# Patient Record
Sex: FEMALE | Race: BLACK OR AFRICAN AMERICAN | NOT HISPANIC OR LATINO | Employment: FULL TIME | URBAN - METROPOLITAN AREA
[De-identification: names, ages, dates, MRNs, and addresses within clinical notes are randomized per-mention and may not be internally consistent; named-entity substitution may affect disease eponyms.]

---

## 2021-06-19 VITALS
HEART RATE: 102 BPM | BODY MASS INDEX: 21.16 KG/M2 | RESPIRATION RATE: 14 BRPM | HEIGHT: 62 IN | OXYGEN SATURATION: 98 % | TEMPERATURE: 98.3 F | WEIGHT: 115 LBS

## 2021-06-19 PROCEDURE — 96372 THER/PROPH/DIAG INJ SC/IM: CPT

## 2021-06-19 PROCEDURE — 99283 EMERGENCY DEPT VISIT LOW MDM: CPT

## 2021-06-20 ENCOUNTER — HOSPITAL ENCOUNTER (EMERGENCY)
Facility: HOSPITAL | Age: 21
Discharge: HOME | End: 2021-06-20
Attending: EMERGENCY MEDICINE | Admitting: EMERGENCY MEDICINE

## 2021-06-20 DIAGNOSIS — Z20.2 STD EXPOSURE: Primary | ICD-10-CM

## 2021-06-20 DIAGNOSIS — N76.0 BV (BACTERIAL VAGINOSIS): ICD-10-CM

## 2021-06-20 DIAGNOSIS — B96.89 BV (BACTERIAL VAGINOSIS): ICD-10-CM

## 2021-06-20 LAB
B-HCG UR QL: NEGATIVE
BACTERIA UR QL AUTO: ABNORMAL /HPF
BILIRUB UR QL STRIP: NEGATIVE
CLARITY UR: CLEAR
CLUE CELLS SPEC QL WET PREP: ABNORMAL
COLOR UR: YELLOW
GLUCOSE UR STRIP-MCNC: NEGATIVE MG/DL
HGB UR QL STRIP.AUTO: NEGATIVE
HYALINE CASTS UR QL AUTO: ABNORMAL /LPF
HYDATID CYST SPEC WET PREP: ABNORMAL
KETONES UR QL STRIP: ABNORMAL
KOH PREP NAIL: NORMAL
LEUKOCYTE ESTERASE UR QL STRIP.AUTO: NEGATIVE
NITRITE UR QL STRIP: NEGATIVE
PH UR STRIP.AUTO: 6.5 [PH] (ref 5–8)
PROT UR QL STRIP: ABNORMAL
RBC # UR: ABNORMAL /HPF
REF LAB TEST METHOD: ABNORMAL
SP GR UR STRIP: >=1.03 (ref 1–1.03)
SQUAMOUS #/AREA URNS HPF: ABNORMAL /HPF
T VAGINALIS SPEC QL WET PREP: ABNORMAL
UROBILINOGEN UR QL STRIP: ABNORMAL
WBC SPEC QL WET PREP: ABNORMAL
WBC UR QL AUTO: ABNORMAL /HPF
YEAST GENITAL QL WET PREP: ABNORMAL

## 2021-06-20 PROCEDURE — 96372 THER/PROPH/DIAG INJ SC/IM: CPT

## 2021-06-20 PROCEDURE — 87210 SMEAR WET MOUNT SALINE/INK: CPT | Performed by: PHYSICIAN ASSISTANT

## 2021-06-20 PROCEDURE — 81001 URINALYSIS AUTO W/SCOPE: CPT | Performed by: PHYSICIAN ASSISTANT

## 2021-06-20 PROCEDURE — 87220 TISSUE EXAM FOR FUNGI: CPT | Performed by: PHYSICIAN ASSISTANT

## 2021-06-20 PROCEDURE — 87491 CHLMYD TRACH DNA AMP PROBE: CPT | Performed by: PHYSICIAN ASSISTANT

## 2021-06-20 PROCEDURE — 87591 N.GONORRHOEAE DNA AMP PROB: CPT | Performed by: PHYSICIAN ASSISTANT

## 2021-06-20 PROCEDURE — 81025 URINE PREGNANCY TEST: CPT | Performed by: PHYSICIAN ASSISTANT

## 2021-06-20 PROCEDURE — 25010000002 CEFTRIAXONE PER 250 MG: Performed by: PHYSICIAN ASSISTANT

## 2021-06-20 RX ORDER — AZITHROMYCIN 250 MG/1
1000 TABLET, FILM COATED ORAL ONCE
Status: COMPLETED | OUTPATIENT
Start: 2021-06-20 | End: 2021-06-20

## 2021-06-20 RX ORDER — METRONIDAZOLE 500 MG/1
500 TABLET ORAL 2 TIMES DAILY
Qty: 14 TABLET | Refills: 0 | Status: SHIPPED | OUTPATIENT
Start: 2021-06-20

## 2021-06-20 RX ADMIN — AZITHROMYCIN DIHYDRATE 1000 MG: 250 TABLET, FILM COATED ORAL at 02:14

## 2021-06-20 RX ADMIN — LIDOCAINE HYDROCHLORIDE 490 MG: 10 INJECTION, SOLUTION EPIDURAL; INFILTRATION; INTRACAUDAL; PERINEURAL at 02:14

## 2021-06-20 NOTE — ED PROVIDER NOTES
MD ATTESTATION NOTE    The ION and I have discussed this patient's history, physical exam, and treatment plan.  I have reviewed the documentation and personally had a face to face interaction with the patient. I affirm the documentation and agree with the treatment and plan.  The attached note describes my personal findings.      Sparkle Marcial is a 20 y.o. female who presents to the ED c/o STD exposure several days ago.  States that her significant other was recently diagnosed with gonorrhea.  She denies any fevers chills no discharge or discomfort.      On exam:  No acute distress, normocephalic atraumatic, supple nontender, regular rate and rhythm, clear to auscultation bilaterally, soft nontender nondistended, moving all extremities well    Pelvic per PA    Labs  Recent Results (from the past 24 hour(s))   Wet Prep, Genital - Swab, Vagina    Collection Time: 06/20/21  1:35 AM    Specimen: Vagina; Swab   Result Value Ref Range    YEAST No yeast seen No yeast seen    HYPHAL ELEMENTS No Hyphal elements seen No Hyphal elements seen    WBC'S 3+ WBC's seen (A) No WBC's seen    Clue Cells, Wet Prep 2+ Clue cells seen (A) No Clue cells seen    Trichomonas, Wet Prep No Trichomonas seen No Trichomonas seen   KOH Prep - Swab, Cervix    Collection Time: 06/20/21  1:35 AM    Specimen: Cervix; Swab   Result Value Ref Range    KOH Prep No yeast or hyphal elements seen No yeast or hyphal elements seen   Urinalysis With Microscopic If Indicated (No Culture) - Urine, Clean Catch    Collection Time: 06/20/21  2:15 AM    Specimen: Urine, Clean Catch   Result Value Ref Range    Color, UA Yellow Yellow, Straw    Appearance, UA Clear Clear    pH, UA 6.5 5.0 - 8.0    Specific Gravity, UA >=1.030 1.005 - 1.030    Glucose, UA Negative Negative    Ketones, UA 15 mg/dL (1+) (A) Negative    Bilirubin, UA Negative Negative    Blood, UA Negative Negative    Protein, UA 30 mg/dL (1+) (A) Negative    Leuk Esterase, UA Negative Negative    Nitrite,  UA Negative Negative    Urobilinogen, UA 1.0 E.U./dL 0.2 - 1.0 E.U./dL   Pregnancy, Urine - Urine, Clean Catch    Collection Time: 06/20/21  2:15 AM    Specimen: Urine, Clean Catch   Result Value Ref Range    HCG, Urine QL Negative Negative   Urinalysis, Microscopic Only - Urine, Clean Catch    Collection Time: 06/20/21  2:15 AM    Specimen: Urine, Clean Catch   Result Value Ref Range    RBC, UA None Seen None Seen, 0-2 /HPF    WBC, UA None Seen None Seen, 0-2 /HPF    Bacteria, UA None Seen None Seen /HPF    Squamous Epithelial Cells, UA 7-12 (A) None Seen, 0-2 /HPF    Hyaline Casts, UA None Seen None Seen /LPF    Methodology Automated Microscopy        Radiology  No Radiology Exams Resulted Within Past 24 Hours    Medical Decision Making:  ED Course as of Jun 20 0543   Sun Jun 20, 2021   0232 She is also noted to have clue cells and and whites on wet prep.  We will treat for BV in addition to STD.  Patient is ready for discharge at this time    [RC]      ED Course User Index  [RC] Isak Brenner III, PA           PPE: Both the patient and I wore a surgical mask throughout the entire patient encounter. I wore protective goggles.     Diagnosis  Final diagnoses:   STD exposure   BV (bacterial vaginosis)        Greg Hong MD  06/20/21 6154

## 2021-06-20 NOTE — ED PROVIDER NOTES
EMERGENCY DEPARTMENT ENCOUNTER    Room Number:  06/06  Date of encounter:  6/20/2021  PCP: Provider, No Known  Historian: Patient      HPI:  Chief Complaint: STD exposure  A complete HPI/ROS/PMH/PSH/SH/FH are unobtainable due to: Nothing    Context: Sparkle Marcial is a 20 y.o. female who presents to the ED c/o STD exposure that occurred approximately 3 to 4 days ago.  Patient states her boyfriend was recently diagnosed with gonorrhea.  He reports scant discharge as of yesterday.  She denies fever, chills, pelvic pain associated with the discharge.  She denies pregnancy.  She is here for further evaluation.      PAST MEDICAL HISTORY  Active Ambulatory Problems     Diagnosis Date Noted   • No Active Ambulatory Problems     Resolved Ambulatory Problems     Diagnosis Date Noted   • No Resolved Ambulatory Problems     No Additional Past Medical History         PAST SURGICAL HISTORY  No past surgical history on file.      FAMILY HISTORY  No family history on file.      SOCIAL HISTORY  Social History     Socioeconomic History   • Marital status: Single     Spouse name: Not on file   • Number of children: Not on file   • Years of education: Not on file   • Highest education level: Not on file   Tobacco Use   • Smoking status: Never Smoker   • Smokeless tobacco: Never Used   Substance and Sexual Activity   • Alcohol use: Defer   • Drug use: Defer   • Sexual activity: Yes     Partners: Male     Birth control/protection: None     Comment: exposure to STD         ALLERGIES  Amoxicillin        REVIEW OF SYSTEMS  Review of Systems   Constitutional: Negative for chills and fever.   Genitourinary: Positive for vaginal discharge. Negative for flank pain and pelvic pain.   Skin: Negative for rash.        All systems reviewed and negative except for those discussed in HPI.       PHYSICAL EXAM    I have reviewed the triage vital signs and nursing notes.    ED Triage Vitals [06/19/21 2300]   Temp Heart Rate Resp BP SpO2   98.3 °F (36.8  °C) 102 14 -- 98 %      Temp src Heart Rate Source Patient Position BP Location FiO2 (%)   Tympanic -- -- -- --       Physical Exam  GENERAL: Well-nourished, nontoxic, no acute distress  HENT: nares patent  EYES: no scleral icterus  CV: regular rhythm, regular rate  RESPIRATORY: normal effort  ABDOMEN: soft, nontender  : No cervical motion tenderness.  There is a somewhat red appearing cervix with questionable discharge.  No adnexal tenderness no CVA tenderness.  MUSCULOSKELETAL: no deformity  NEURO: alert, moves all extremities, follows commands  SKIN: warm, dry        LAB RESULTS  Recent Results (from the past 24 hour(s))   Wet Prep, Genital - Swab, Vagina    Collection Time: 06/20/21  1:35 AM    Specimen: Vagina; Swab   Result Value Ref Range    YEAST No yeast seen No yeast seen    HYPHAL ELEMENTS No Hyphal elements seen No Hyphal elements seen    WBC'S 3+ WBC's seen (A) No WBC's seen    Clue Cells, Wet Prep 2+ Clue cells seen (A) No Clue cells seen    Trichomonas, Wet Prep No Trichomonas seen No Trichomonas seen   KOH Prep - Swab, Cervix    Collection Time: 06/20/21  1:35 AM    Specimen: Cervix; Swab   Result Value Ref Range    KOH Prep No yeast or hyphal elements seen No yeast or hyphal elements seen       Ordered the above labs and independently reviewed the results.        RADIOLOGY  No Radiology Exams Resulted Within Past 24 Hours    I ordered the above noted radiological studies. Reviewed by me and discussed with radiologist.  See dictation for official radiology interpretation.      PROCEDURES    Procedures      MEDICATIONS GIVEN IN ER    Medications   cefTRIAXone (ROCEPHIN) 350 mg/ml in lidocaine 1% IM syringe (500 mg vial) (490 mg Intramuscular Given 6/20/21 0214)   azithromycin (ZITHROMAX) tablet 1,000 mg (1,000 mg Oral Given 6/20/21 0214)         PROGRESS, DATA ANALYSIS, CONSULTS, AND MEDICAL DECISION MAKING    All labs have been independently reviewed by me.  All radiology studies have been reviewed  by me and discussed with radiologist dictating the report.   EKG's independently viewed and interpreted by me.  Discussion below represents my analysis of pertinent findings related to patient's condition, differential diagnosis, treatment plan and final disposition.    DDx includes but is not limited to: Gonorrhea, chlamydia, trichomoniasis, BV, PID.  Exam points away from PID.  Given the known exposure to gonorrhea we will go ahead and obtain GNC, wet prep, KOH, UA, hCG.  We will treat the patient with 500 mg Rocephin IM and a gram of Zithromax p.o.    ED Course as of Jun 20 0237   Sun Jun 20, 2021   0232 She is also noted to have clue cells and and whites on wet prep.  We will treat for BV in addition to STD.  Patient is ready for discharge at this time    [RC]      ED Course User Index  [RC] Isak Brenner III, PA       Patient was placed in face mask in first look. Patient was wearing facemask when I entered the room and throughout our encounter. I wore full protective equipment throughout this patient encounter including a face mask, and gloves. Hand hygiene was performed before donning protective equipment and after removal when leaving the room.    AS OF 02:37 EDT VITALS:    BP -    HR - 102  TEMP - 98.3 °F (36.8 °C) (Tympanic)  O2 SATS - 98%        DIAGNOSIS  Final diagnoses:   STD exposure   BV (bacterial vaginosis)         DISPOSITION  DISCHARGE    Patient discharged in stable condition.    Reviewed implications of results, diagnosis, meds, responsibility to follow up, warning signs and symptoms of possible worsening, potential complications and reasons to return to ER.    Patient/Family voiced understanding of above instructions.    Discussed plan for discharge, as there is no emergent indication for admission. Patient referred to primary care provider for BP management due to today's BP. Pt/family is agreeable and understands need for follow up and repeat testing.  Pt is aware that discharge  does not mean that nothing is wrong but it indicates no emergency is present that requires admission and they must continue care with follow-up as given below or physician of their choice.     FOLLOW-UP  PATIENT CONNECTION - [facility] [address]  [phone]  Schedule an appointment as soon as possible for a visit   For further evaluation and treatment    [facility]  [address] [address]  [phone]  Schedule an appointment as soon as possible for a visit   For further evaluation and treatment should you wish for further STD testing         Medication List      New Prescriptions    metroNIDAZOLE 500 MG tablet  Commonly known as: FLAGYL  Take 1 tablet by mouth 2 (Two) Times a Day.           Where to Get Your Medications      You can get these medications from any pharmacy    Bring a paper prescription for each of these medications  · metroNIDAZOLE 500 MG tablet                Isak Brenner III, PA  06/20/21 0237

## 2021-06-20 NOTE — ED NOTES
Provided pt with water to try to get her to urinated per TAMMIE Brenner request.     Denise Wiley, CARLOS  06/20/21 0203

## 2021-06-20 NOTE — ED NOTES
Here for a std check due to sexual partner having pain w urination and dx w gonorrhea recently. She does not have any symptoms.     +mask +triage wearing appropriate PPE.     Dayana Babb, RN  06/19/21 4245

## 2021-06-20 NOTE — ED NOTES
Pt ambulatory c steady gait, AAOx4, ABC's intact, NAD noted at this time. Pt discharged c belongings and educational packet. PPE worn by this RN c pt wearing mask during encounters.     Pt playing in bed with sexual partner upon this RN arrival to ED room for d/c. Pt unwilling to sit up for d/c vitals.      Austin Navarro, RN  06/20/21 6366

## 2021-06-22 LAB
C TRACH RRNA SPEC QL NAA+PROBE: NEGATIVE
N GONORRHOEA RRNA SPEC QL NAA+PROBE: NEGATIVE

## 2024-07-20 ENCOUNTER — HOSPITAL ENCOUNTER (EMERGENCY)
Facility: HOSPITAL | Age: 24
Discharge: HOME | End: 2024-07-21
Attending: EMERGENCY MEDICINE

## 2024-07-20 DIAGNOSIS — L02.215 PERINEAL ABSCESS: Primary | ICD-10-CM

## 2024-07-20 PROCEDURE — 99283 EMERGENCY DEPT VISIT LOW MDM: CPT

## 2024-07-20 RX ORDER — HYDROCODONE BITARTRATE AND ACETAMINOPHEN 5; 325 MG/1; MG/1
1 TABLET ORAL EVERY 6 HOURS PRN
Status: DISCONTINUED | OUTPATIENT
Start: 2024-07-20 | End: 2024-07-21 | Stop reason: HOSPADM

## 2024-07-20 RX ORDER — LIDOCAINE HYDROCHLORIDE AND EPINEPHRINE 10; 10 MG/ML; UG/ML
10 INJECTION, SOLUTION INFILTRATION; PERINEURAL ONCE
Status: COMPLETED | OUTPATIENT
Start: 2024-07-20 | End: 2024-07-21

## 2024-07-21 VITALS
SYSTOLIC BLOOD PRESSURE: 124 MMHG | BODY MASS INDEX: 19.49 KG/M2 | HEART RATE: 103 BPM | DIASTOLIC BLOOD PRESSURE: 81 MMHG | OXYGEN SATURATION: 98 % | HEIGHT: 63 IN | WEIGHT: 110 LBS | TEMPERATURE: 97.5 F | RESPIRATION RATE: 16 BRPM

## 2024-07-21 RX ORDER — DOXYCYCLINE 100 MG/1
100 CAPSULE ORAL ONCE
Status: COMPLETED | OUTPATIENT
Start: 2024-07-21 | End: 2024-07-21

## 2024-07-21 RX ORDER — DOXYCYCLINE 100 MG/1
100 CAPSULE ORAL 2 TIMES DAILY
Qty: 14 CAPSULE | Refills: 0 | Status: SHIPPED | OUTPATIENT
Start: 2024-07-21

## 2024-07-21 RX ADMIN — DOXYCYCLINE 100 MG: 100 CAPSULE ORAL at 02:12

## 2024-07-21 RX ADMIN — LIDOCAINE HYDROCHLORIDE AND EPINEPHRINE 10 ML: 10; 10 INJECTION, SOLUTION INFILTRATION; PERINEURAL at 01:34

## 2024-07-21 RX ADMIN — HYDROCODONE BITARTRATE AND ACETAMINOPHEN 1 TABLET: 5; 325 TABLET ORAL at 00:08

## 2024-07-21 NOTE — ED NOTES
"Pt to ER from home c/o abscess \"between my legs\" x 2 days. Pt states \"it's too painful for me to touch it so I came here.\"   "

## 2024-07-21 NOTE — DISCHARGE INSTRUCTIONS
Change the bandage 2-3 times daily.  After 2 days, remove the packing. Wash hands, remove gently, and then wash hands again. Anything that drains from this area is considered infections/contagious.  Take all of the medication as prescribed.  Recheck with your PCP in 2 days.  Return to the ER for fever >100.4, vomiting, spreading redness, increasing pain, any concerns.

## 2024-07-21 NOTE — ED PROVIDER NOTES
EMERGENCY DEPARTMENT MD ATTESTATION NOTE    Room Number:  06/06  PCP: Provider, No Known  Independent Historians: Patient    HPI:  A complete HPI/ROS/PMH/PSH/SH/FH are unobtainable due to: None    Chronic or social conditions impacting patient care (Social Determinants of Health): None      Context: Sparkle Marcial is a 23 y.o. female who presents to the ED c/o acute abscess in the genital area which is worsened for the past couple of days.  She denies any fevers.  She says she has had similar abscess formations in this area in the past which were able to spontaneously drain in time.  She has never required any incision and drainage procedures by medical providers before.  She denies any nausea or vomiting or diarrhea.  Denies any dysuria symptoms.      Review of prior external notes (non-ED) -and- Review of prior external test results outside of this encounter: I independently reviewed the family medicine progress note from May 27, 2023 when patient was evaluated for vaginal discharge    Prescription drug monitoring program review: DORIE reviewed by Aram Galvez MD         PHYSICAL EXAM    I have reviewed the triage vital signs and nursing notes.    ED Triage Vitals   Temp Heart Rate Resp BP SpO2   07/20/24 2316 07/20/24 2316 07/20/24 2316 07/21/24 0009 07/20/24 2316   97.5 °F (36.4 °C) 103 16 124/81 98 %      Temp src Heart Rate Source Patient Position BP Location FiO2 (%)   07/20/24 2316 -- -- -- --   Tympanic           Physical Exam  GENERAL: alert, no acute distress but appears uncomfortable  SKIN: Warm, dry  HENT: Normocephalic, atraumatic  EYES: no scleral icterus  CV: regular rhythm, regular rate  RESPIRATORY: normal effort, lungs clear  ABDOMEN: soft, nontender, nondistended  MUSCULOSKELETAL: no deformity, no asymmetry to the extremity  NEURO: alert, moves all extremities, follows commands        MEDICATIONS GIVEN IN ER  Medications   HYDROcodone-acetaminophen (NORCO) 5-325 MG per tablet 1 tablet (1  tablet Oral Given 7/21/24 0008)   lidocaine 1% - EPINEPHrine 1:115539 (XYLOCAINE W/EPI) 1 %-1:935722 injection 10 mL (10 mL Injection Given 7/21/24 0134)   doxycycline (MONODOX) capsule 100 mg (100 mg Oral Given 7/21/24 0212)         ORDERS PLACED DURING THIS VISIT:  Orders Placed This Encounter   Procedures    Incision & Drainage         PROCEDURES  Procedures            PROGRESS, DATA ANALYSIS, CONSULTS, AND MEDICAL DECISION MAKING  All labs have been independently interpreted by me.  All radiology studies have been reviewed by me. All EKG's have been independently viewed and interpreted by me.  Discussion below represents my analysis of pertinent findings related to patient's condition, differential diagnosis, treatment plan and final disposition.    Differential diagnosis includes but is not limited to Bartholin's gland abscess, perirectal abscess, cellulitis, vaginitis, hemorrhoid.    Clinical Scores:                   ED Course as of 07/21/24 0239   Sat Jul 20, 2024   2320 I discussed the case with Dr. Galvez and he agrees to evaluate the patient at the bedside.    [CC]   Sun Jul 21, 2024   0152 I&D was performed at the bedside.  Patient tolerated procedure well.      She is a generally healthy 23-year-old female who presents today with a simple abscess in the perineum.  On arrival here in the emergency department vitals are reassuring, she is afebrile.  On my exam the patient has a small 1 cm abscess in the perineum.  There is no surrounding induration, cellulitis, or crepitus.  She is not a diabetic and is not anticoagulated.  I&D was performed and patient tolerated procedure well.  Will start patient on doxycycline with first dose given here in the ED and prescription sent to the pharmacy.  Educated her on wound care instructions.  Return precautions were given.  She is appropriate for discharge with outpatient follow-up. [CC]      ED Course User Index  [CC] Allison Mckeon PA-C       MDM:   Patient  "presents with an abscess formation in the perineum which shows clinical features amenable to incision and drainage.  Will proceed with this procedure in the ED.  Plan to discharge home on oral antibiotic regimen and have her follow-up with PCP.  Will discuss with patient the usual \"return to ER\" instructions prior to discharge.    COMPLEXITY OF CARE  Admission was considered but after careful review of the patient's presentation, physical examination, diagnostic results, and response to treatment the patient may be safely discharged with outpatient follow-up.    Please note that portions of this document were completed with a voice recognition program.    Note Disclaimer: At [facility], we believe that sharing information builds trust and better relationships. You are receiving this note because you recently visited [facility]. It is possible you will see health information before a provider has talked with you about it. This kind of information can be easy to misunderstand. To help you fully understand what it means for your health, we urge you to discuss this note with your provider.         Aram Galvez MD  07/21/24 0203       Aram Galvez MD  07/21/24 0239    "

## 2024-07-21 NOTE — ED PROVIDER NOTES
EMERGENCY DEPARTMENT ENCOUNTER  Room Number:  06/06  PCP: Provider, No Known  Independent Historians: Patient      HPI:  Chief Complaint: had concerns including Abscess.     A complete HPI/ROS/PMH/PSH/SH/FH are unobtainable due to: None    Chronic or social conditions impacting patient care (Social Determinants of Health): None      Context: Sparkle Marcial is a 23 y.o. female presents emergency department today with an abscess in her groin.  Patient reports has been present for the last few days and she says she has had them previously but typically drain on their own and go away she said it has now become extremely painful which is what prompted her ED visit tonight.  She says she is not currently sexually active and has no concern for sexually transmitted infections.  She denies fevers or chills.  She denies nausea, vomiting or diarrhea.  She denies urinary symptoms.  She is not a diabetic.      Review of prior external notes (non-ED) -and- Review of prior external test results outside of this encounter:   I reviewed labs from 2/28/2024, hemoglobin 12.7, creatinine 0.5      PAST MEDICAL HISTORY  Active Ambulatory Problems     Diagnosis Date Noted    No Active Ambulatory Problems     Resolved Ambulatory Problems     Diagnosis Date Noted    No Resolved Ambulatory Problems     No Additional Past Medical History         PAST SURGICAL HISTORY  No past surgical history on file.      FAMILY HISTORY  No family history on file.      SOCIAL HISTORY  Social History     Socioeconomic History    Marital status: Single   Tobacco Use    Smoking status: Never    Smokeless tobacco: Never   Substance and Sexual Activity    Alcohol use: Defer    Drug use: Defer    Sexual activity: Yes     Partners: Male     Birth control/protection: None     Comment: exposure to STD         ALLERGIES  Amoxicillin      REVIEW OF SYSTEMS  Included in HPI  All systems reviewed and negative except for those discussed in HPI.      PHYSICAL EXAM    I have  reviewed the triage vital signs and nursing notes.    ED Triage Vitals [07/20/24 2316]   Temp Heart Rate Resp BP SpO2   97.5 °F (36.4 °C) 103 16 -- 98 %      Temp src Heart Rate Source Patient Position BP Location FiO2 (%)   Tympanic -- -- -- --       Physical Exam  Constitutional:       General: She is not in acute distress.     Appearance: Normal appearance. She is obese.   HENT:      Head: Normocephalic and atraumatic.      Nose: Nose normal.      Mouth/Throat:      Mouth: Mucous membranes are moist.   Eyes:      Extraocular Movements: Extraocular movements intact.      Pupils: Pupils are equal, round, and reactive to light.   Cardiovascular:      Rate and Rhythm: Normal rate and regular rhythm.      Pulses: Normal pulses.      Heart sounds: Normal heart sounds.   Pulmonary:      Effort: Pulmonary effort is normal. No respiratory distress.      Breath sounds: Normal breath sounds.   Abdominal:      General: Abdomen is flat. There is no distension.      Palpations: Abdomen is soft.      Tenderness: There is no abdominal tenderness.   Genitourinary:     Comments: Small 1 cm abscess in the left perineum just inferior to the vagina.  Central area of fluctuance without significant induration or surrounding cellulitis.  No crepitus.  Musculoskeletal:         General: Normal range of motion.      Cervical back: Normal range of motion and neck supple.   Skin:     General: Skin is warm and dry.      Capillary Refill: Capillary refill takes less than 2 seconds.   Neurological:      General: No focal deficit present.      Mental Status: She is alert and oriented to person, place, and time.   Psychiatric:         Mood and Affect: Mood normal.         Behavior: Behavior normal.         RADIOLOGY  No Radiology Exams Resulted Within Past 24 Hours      MEDICATIONS GIVEN IN ER  Medications   HYDROcodone-acetaminophen (NORCO) 5-325 MG per tablet 1 tablet (1 tablet Oral Given 7/21/24 0008)   lidocaine 1% - EPINEPHrine 1:[id number]  (XYLOCAINE W/EPI) 1 %-1:989877 injection 10 mL (10 mL Injection Given 7/21/24 0134)   doxycycline (MONODOX) capsule 100 mg (100 mg Oral Given 7/21/24 0212)           OUTPATIENT MEDICATION MANAGEMENT:  Current Facility-Administered Medications Ordered in Epic   Medication Dose Route Frequency Provider Last Rate Last Admin    HYDROcodone-acetaminophen (NORCO) 5-325 MG per tablet 1 tablet  1 tablet Oral Q6H PRN Aram Galvez MD   1 tablet at 07/21/24 0008     Current Outpatient Medications Ordered in Epic   Medication Sig Dispense Refill    doxycycline (MONODOX) 100 MG capsule Take 1 capsule by mouth 2 (Two) Times a Day. 14 capsule 0    metroNIDAZOLE (FLAGYL) 500 MG tablet Take 1 tablet by mouth 2 (Two) Times a Day. 14 tablet 0         PROCEDURES  Incision & Drainage    Date/Time: 7/21/2024 2:31 AM    Performed by: Allison Mckeon PA-C  Authorized by: Aram Galvez MD    Consent:     Consent obtained:  Verbal    Consent given by:  Patient    Risks, benefits, and alternatives were discussed: yes      Risks discussed:  Damage to other organs, bleeding, incomplete drainage and infection    Alternatives discussed:  No treatment  Location:     Type:  Abscess    Size:  1    Location:  Anogenital    Anogenital location:  Perineum  Pre-procedure details:     Skin preparation:  Chlorhexidine  Anesthesia:     Anesthesia method:  Local infiltration    Local anesthetic:  Lidocaine 1% WITH epi  Procedure type:     Complexity:  Complex  Procedure details:     Incision types:  Single straight    Incision depth:  Subcutaneous    Wound management:  Probed and deloculated and irrigated with saline    Drainage:  Purulent and bloody    Drainage amount:  Scant    Wound treatment:  Wound left open    Packing materials:  None          PROGRESS, DATA ANALYSIS, CONSULTS, AND MEDICAL DECISION MAKING  ORDERS PLACED DURING THIS VISIT:  Orders Placed This Encounter   Procedures    Incision & Drainage       All labs have been independently  interpreted by me.  All radiology studies have been reviewed by me. All EKG's have been independently viewed and interpreted by me.  Discussion below represents my analysis of pertinent findings related to patient's condition, differential diagnosis, treatment plan and final disposition.    Differential diagnosis includes but is not limited to:   Abscess, cellulitis, Bartholin gland absces    ED Course:  ED Course as of 07/21/24 0233   Sat Jul 20, 2024   2320 I discussed the case with Dr. Galvez and he agrees to evaluate the patient at the bedside.    [CC]   Sun Jul 21, 2024   0152 I&D was performed at the bedside.  Patient tolerated procedure well.      She is a generally healthy 23-year-old female who presents today with a simple abscess in the perineum.  On arrival here in the emergency department vitals are reassuring, she is afebrile.  On my exam the patient has a small 1 cm abscess in the perineum.  There is no surrounding induration, cellulitis, or crepitus.  She is not a diabetic and is not anticoagulated.  I&D was performed and patient tolerated procedure well.  Will start patient on doxycycline with first dose given here in the ED and prescription sent to the pharmacy.  Educated her on wound care instructions.  Return precautions were given.  She is appropriate for discharge with outpatient follow-up. [CC]      ED Course User Index  [CC] Allison Mckeon PA-C           AS OF 02:33 EDT VITALS:    BP - 124/81  HR - 103  TEMP - 97.5 °F (36.4 °C) (Tympanic)  O2 SATS - 98%      DIAGNOSIS  Final diagnoses:   Perineal abscess         DISPOSITION  ED Disposition       ED Disposition   Discharge    Condition   Stable    Comment   --                      Please note that portions of this document were completed with a voice recognition program.    Note Disclaimer: At [facility], we believe that sharing information builds trust and better relationships. You are receiving this note because you recently visited  [facility]. It is possible you will see health information before a provider has talked with you about it. This kind of information can be easy to misunderstand. To help you fully understand what it means for your health, we urge you to discuss this note with your provider.     Allison Mckeon PA-C  07/21/24 0233